# Patient Record
Sex: MALE | Race: WHITE | NOT HISPANIC OR LATINO | URBAN - METROPOLITAN AREA
[De-identification: names, ages, dates, MRNs, and addresses within clinical notes are randomized per-mention and may not be internally consistent; named-entity substitution may affect disease eponyms.]

---

## 2020-06-17 ENCOUNTER — OFFICE VISIT (OUTPATIENT)
Dept: URBAN - METROPOLITAN AREA CLINIC 63 | Facility: CLINIC | Age: 45
End: 2020-06-17

## 2020-07-14 ENCOUNTER — OFFICE VISIT (OUTPATIENT)
Dept: URBAN - METROPOLITAN AREA TELEHEALTH 2 | Facility: TELEHEALTH | Age: 45
End: 2020-07-14

## 2021-02-01 ENCOUNTER — OFFICE VISIT (OUTPATIENT)
Dept: URBAN - METROPOLITAN AREA CLINIC 121 | Facility: CLINIC | Age: 46
End: 2021-02-01

## 2021-06-16 ENCOUNTER — OFFICE VISIT (OUTPATIENT)
Dept: URBAN - METROPOLITAN AREA CLINIC 63 | Facility: CLINIC | Age: 46
End: 2021-06-16

## 2021-07-20 ENCOUNTER — OFFICE VISIT (OUTPATIENT)
Dept: URBAN - METROPOLITAN AREA SURGERY CENTER 4 | Facility: SURGERY CENTER | Age: 46
End: 2021-07-20

## 2021-08-04 ENCOUNTER — OFFICE VISIT (OUTPATIENT)
Dept: URBAN - METROPOLITAN AREA SURGERY CENTER 4 | Facility: SURGERY CENTER | Age: 46
End: 2021-08-04

## 2021-08-05 LAB — PATHOLOGY (INDENTED REPORT): (no result)

## 2022-07-09 ENCOUNTER — TELEPHONE ENCOUNTER (OUTPATIENT)
Dept: URBAN - METROPOLITAN AREA CLINIC 121 | Facility: CLINIC | Age: 47
End: 2022-07-09

## 2022-07-09 RX ORDER — RIFAXIMIN 550 MG/1
THREE TIMES A DAY TABLET ORAL THREE TIMES A DAY
Refills: 2 | OUTPATIENT
Start: 2019-01-28 | End: 2019-02-27

## 2022-07-09 RX ORDER — CYCLOBENZAPRINE HYDROCHLORIDE 10 MG/1
TABLET, FILM COATED ORAL TAKE AS DIRECTED
Refills: 0 | OUTPATIENT
Start: 2017-11-26 | End: 2017-12-05

## 2022-07-09 RX ORDER — DICYCLOMINE HYDROCHLORIDE 20 MG/1
FOUR TIMES A DAY TABLET ORAL
Refills: 3 | OUTPATIENT
Start: 2015-05-28 | End: 2015-05-28

## 2022-07-09 RX ORDER — HYDROCODONE BITARTRATE AND ACETAMINOPHEN 5; 325 MG/1; MG/1
TABLET ORAL TAKE AS DIRECTED
Refills: 0 | OUTPATIENT
Start: 2015-04-02 | End: 2015-05-28

## 2022-07-09 RX ORDER — AMLODIPINE BESYLATE AND BENAZEPRIL HYDROCHLORIDE 5; 10 MG/1; MG/1
CAPSULE ORAL ONCE A DAY
Refills: 0 | OUTPATIENT
Start: 2021-06-16 | End: 2021-06-16

## 2022-07-09 RX ORDER — OMEPRAZOLE 40 MG/1
ONCE A DAY CAPSULE, DELAYED RELEASE ORAL ONCE A DAY
Refills: 1 | OUTPATIENT
Start: 2020-07-14 | End: 2021-06-16

## 2022-07-09 RX ORDER — SUCRALFATE 1 G/1
FOUR TIMES A DAY DISSOLVE ONE TAB IN 4OZ WATER, PO, QAC AND QHS TABLET ORAL
Refills: 1 | OUTPATIENT
Start: 2017-12-27 | End: 2018-05-21

## 2022-07-09 RX ORDER — CHLORHEXIDINE GLUCONATE 1.2 MG/ML
RINSE ORAL TAKE AS DIRECTED
Refills: 0 | OUTPATIENT
Start: 2017-11-08 | End: 2017-12-05

## 2022-07-09 RX ORDER — PANCRELIPASE 36000; 180000; 114000 [USP'U]/1; [USP'U]/1; [USP'U]/1
TAKE AS DIRECTED: 2 CAPSULES WITH MEALS AND 1 CAPSULE WITH SNACKS CAPSULE, DELAYED RELEASE PELLETS ORAL TAKE AS DIRECTED
Refills: 3 | OUTPATIENT
Start: 2018-12-11 | End: 2019-01-28

## 2022-07-09 RX ORDER — DEXLANSOPRAZOLE 30 MG/1
ONCE A DAY CAPSULE, DELAYED RELEASE ORAL ONCE A DAY
Refills: 0 | OUTPATIENT
Start: 2018-01-30 | End: 2019-02-27

## 2022-07-09 RX ORDER — LEVOFLOXACIN 5 MG/ML
INJECTION INTRAVENOUS TAKE AS DIRECTED
Refills: 0 | OUTPATIENT
Start: 2015-04-02 | End: 2015-05-28

## 2022-07-09 RX ORDER — METRONIDAZOLE 500 MG/1
TABLET ORAL
Refills: 0 | OUTPATIENT
Start: 2013-04-18 | End: 2013-06-26

## 2022-07-09 RX ORDER — METRONIDAZOLE 250 MG/1
TABLET ORAL THREE TIMES A DAY
Refills: 0 | OUTPATIENT
Start: 2015-04-02 | End: 2015-05-28

## 2022-07-09 RX ORDER — RIFAXIMIN 550 MG/1
THREE TIMES A DAY, PO, X 2 WEEKS TABLET ORAL THREE TIMES A DAY
Refills: 2 | OUTPATIENT
Start: 2019-02-27 | End: 2019-02-27

## 2022-07-09 RX ORDER — OMEPRAZOLE 40 MG/1
ONCE A DAY TAKE 30 MIN PRIOR TO MORNING MEAL CAPSULE, DELAYED RELEASE ORAL ONCE A DAY
Refills: 3 | OUTPATIENT
Start: 2017-12-27 | End: 2019-02-27

## 2022-07-09 RX ORDER — SUCRALFATE 1 G/1
FOUR TIMES A DAY DISSOLVE ONE TAB IN 4OZ WATER, PO, QAC AND QHS TABLET ORAL
Refills: 1 | OUTPATIENT
Start: 2018-05-21 | End: 2019-02-27

## 2022-07-09 RX ORDER — CIPROFLOXACIN HCL 500 MG
TABLET ORAL
Refills: 0 | OUTPATIENT
Start: 2013-04-23 | End: 2013-06-26

## 2022-07-09 RX ORDER — OXYCODONE AND ACETAMINOPHEN 5; 325 MG/1; MG/1
TABLET ORAL TAKE AS DIRECTED
Refills: 0 | OUTPATIENT
Start: 2017-11-26 | End: 2017-12-05

## 2022-07-10 ENCOUNTER — TELEPHONE ENCOUNTER (OUTPATIENT)
Dept: URBAN - METROPOLITAN AREA CLINIC 121 | Facility: CLINIC | Age: 47
End: 2022-07-10

## 2022-07-10 RX ORDER — RIFAXIMIN 550 MG/1
TAKE ONE TABLET 3 TIMES A DAY FOR 14 DAYS TABLET ORAL THREE TIMES A DAY
Refills: 0 | Status: ACTIVE | COMMUNITY
Start: 2019-02-13

## 2022-07-10 RX ORDER — HYOSCYAMINE SULFATE 0.12 MG/1
AT BEDTIME TABLET, ORALLY DISINTEGRATING ORAL ONCE A DAY
Refills: 5 | Status: ACTIVE | COMMUNITY
Start: 2013-07-16

## 2022-07-10 RX ORDER — DICYCLOMINE HYDROCHLORIDE 20 MG/1
FOUR TIMES A DAY TABLET ORAL
Refills: 3 | Status: ACTIVE | COMMUNITY
Start: 2015-05-29

## 2022-07-10 RX ORDER — AMLODIPINE BESYLATE AND BENAZEPRIL HYDROCHLORIDE 5; 10 MG/1; MG/1
CAPSULE ORAL ONCE A DAY
Refills: 0 | Status: ACTIVE | COMMUNITY
Start: 2021-06-16

## 2022-07-10 RX ORDER — RIFAXIMIN 550 MG/1
THREE TIMES A DAY, PO, X 2 WEEKS TABLET ORAL THREE TIMES A DAY
Refills: 2 | Status: ACTIVE | COMMUNITY
Start: 2019-02-27

## 2022-07-10 RX ORDER — HYDROCORTISONE ACETATE 25 MG/1
SUPPOSITORY RECTAL TWICE A DAY
Refills: 5 | Status: ACTIVE | COMMUNITY
Start: 2013-07-16

## 2024-06-18 ENCOUNTER — OFFICE VISIT (OUTPATIENT)
Dept: URBAN - METROPOLITAN AREA CLINIC 63 | Facility: CLINIC | Age: 49
End: 2024-06-18
Payer: COMMERCIAL

## 2024-06-18 ENCOUNTER — LAB OUTSIDE AN ENCOUNTER (OUTPATIENT)
Dept: URBAN - METROPOLITAN AREA CLINIC 63 | Facility: CLINIC | Age: 49
End: 2024-06-18

## 2024-06-18 ENCOUNTER — DASHBOARD ENCOUNTERS (OUTPATIENT)
Age: 49
End: 2024-06-18

## 2024-06-18 VITALS
DIASTOLIC BLOOD PRESSURE: 80 MMHG | SYSTOLIC BLOOD PRESSURE: 122 MMHG | TEMPERATURE: 97.4 F | BODY MASS INDEX: 31.89 KG/M2 | HEIGHT: 71 IN | HEART RATE: 68 BPM | OXYGEN SATURATION: 98 % | WEIGHT: 227.8 LBS

## 2024-06-18 DIAGNOSIS — Z80.0 FAMILY HISTORY OF COLON CANCER: ICD-10-CM

## 2024-06-18 DIAGNOSIS — Z86.010 PERSONAL HISTORY OF COLONIC POLYPS: ICD-10-CM

## 2024-06-18 DIAGNOSIS — K57.92 DIVERTICULITIS: ICD-10-CM

## 2024-06-18 PROBLEM — 307496006: Status: ACTIVE | Noted: 2024-06-18

## 2024-06-18 PROCEDURE — 99204 OFFICE O/P NEW MOD 45 MIN: CPT | Performed by: PHYSICIAN ASSISTANT

## 2024-06-18 RX ORDER — AMLODIPINE BESYLATE 5 MG/1
TAKE ONE TABLET BY MOUTH ONE TIME DAILY TABLET ORAL
Qty: 90 UNSPECIFIED | Refills: 0 | Status: ACTIVE | COMMUNITY

## 2024-06-18 NOTE — HPI-TODAY'S VISIT:
48-year-old male, patient of Dr. Grant, with history of diverticulitis, hypertension, GERD, PUD,, colon polyps hiatal hernia repair in 2018 presents to the office for follow-up after having been seen in the ER on April 27, 2024 and again on June 17, 2024 with left lower quadrant abdominal pain.  At his first ER visit in April, he reported having left lower quadrant abdominal pain which began 1 day prior.  His labs including CBC, CMP and lipase were unremarkable.  CT scan of the abdomen and pelvis with contrast demonstrated peridiverticular inflammatory changes involving the proximal to mid sigmoid colon with CT appearance compatible with acute diverticulitis.  No evidence of perforation or abscess.  There was associated bowel wall thickening at the proximal to mid sigmoid colon may reflect developing inflammatory stricture.  He was discharged with a 7-day course of Cipro and Flagyl. He presented back to the ER yesterday with left lower quadrant abdominal pain which began 1 day prior.  His CBC, CMP, and lipase were unremarkable.  Urinalysis was negative.  CT scan of the abdomen and pelvis with contrast demonstrated acute descending colonic diverticulitis.  No abscess or perforation.  He was discharged with a 7-day course of Augmentin.  He follows up today doing well. He has no abdomnal pain today. He has no diarrhea or constipation. Bowels have been soft. No blood in the stool. He has not seen Dr Higuera since 2020. He never had his planned colectomy in 2020 for recurrent diverticulitis.   He was following with Dr. Higuera in 2020 for elective partial colon resection for recurrent diverticulitis.  He was initially scheduled in May 2020 but his surgery was canceled due to COVID-19.  Colonoscopy 8/4/2021:The terminal ileum appeared normal.  Pandiverticulosis.  Mild inflammation was found in the sigmoid colon.  Biopsy of the sigmoid colon showed colonic mucosa with prominent lymphoid follicles and no significant histopathological changes.  Grade 2 internal hemorrhoids were observed.  He has a family history of colon cancer in multiple uncles.

## 2024-07-22 ENCOUNTER — TELEPHONE ENCOUNTER (OUTPATIENT)
Dept: URBAN - METROPOLITAN AREA CLINIC 23 | Facility: CLINIC | Age: 49
End: 2024-07-22

## 2024-08-07 ENCOUNTER — CLAIMS CREATED FROM THE CLAIM WINDOW (OUTPATIENT)
Dept: URBAN - METROPOLITAN AREA SURGERY CENTER 4 | Facility: SURGERY CENTER | Age: 49
End: 2024-08-07
Payer: COMMERCIAL

## 2024-08-07 DIAGNOSIS — K57.30 DIVERTICULOSIS OF LARGE INTESTINE WITHOUT PERFORATION OR ABSCESS WITHOUT BLEEDING: ICD-10-CM

## 2024-08-07 DIAGNOSIS — R93.3 ABNORMAL FINDINGS ON DIAGNOSTIC IMAGING OF OTHER PARTS OF DIGESTIVE TRACT: ICD-10-CM

## 2024-08-07 DIAGNOSIS — K64.1 SECOND DEGREE HEMORRHOIDS: ICD-10-CM

## 2024-08-07 DIAGNOSIS — R93.5 ABNORMAL CT OF THE ABDOMEN: ICD-10-CM

## 2024-08-07 DIAGNOSIS — K64.0 GRADE I HEMORRHOIDS: ICD-10-CM

## 2024-08-07 DIAGNOSIS — K57.30 COLONIC DIVERTICULOSIS: ICD-10-CM

## 2024-08-07 PROCEDURE — 45378 DIAGNOSTIC COLONOSCOPY: CPT | Performed by: INTERNAL MEDICINE

## 2024-08-07 PROCEDURE — 00811 ANES LWR INTST NDSC NOS: CPT | Performed by: NURSE ANESTHETIST, CERTIFIED REGISTERED

## 2024-08-07 RX ORDER — AMLODIPINE BESYLATE 5 MG/1
TAKE ONE TABLET BY MOUTH ONE TIME DAILY TABLET ORAL
Qty: 90 UNSPECIFIED | Refills: 0 | Status: ACTIVE | COMMUNITY

## 2024-08-27 ENCOUNTER — OFFICE VISIT (OUTPATIENT)
Dept: URBAN - METROPOLITAN AREA CLINIC 63 | Facility: CLINIC | Age: 49
End: 2024-08-27
Payer: COMMERCIAL

## 2024-08-27 VITALS
TEMPERATURE: 98 F | SYSTOLIC BLOOD PRESSURE: 122 MMHG | HEART RATE: 72 BPM | OXYGEN SATURATION: 98 % | DIASTOLIC BLOOD PRESSURE: 84 MMHG | WEIGHT: 229.6 LBS | BODY MASS INDEX: 32.14 KG/M2 | HEIGHT: 71 IN

## 2024-08-27 DIAGNOSIS — K57.90 DIVERTICULOSIS: ICD-10-CM

## 2024-08-27 PROBLEM — 397881000: Status: ACTIVE | Noted: 2024-08-27

## 2024-08-27 PROCEDURE — 99213 OFFICE O/P EST LOW 20 MIN: CPT

## 2024-08-27 RX ORDER — AMLODIPINE BESYLATE 5 MG/1
TAKE ONE TABLET BY MOUTH ONE TIME DAILY TABLET ORAL
Qty: 90 UNSPECIFIED | Refills: 0 | Status: ACTIVE | COMMUNITY

## 2024-08-27 RX ORDER — PROMETHAZINE HYDROCHLORIDE 25 MG/ML
AS DIRECTED INJECTION INTRAMUSCULAR; INTRAVENOUS
Status: ON HOLD | COMMUNITY

## 2024-08-27 RX ORDER — OXYCODONE HYDROCHLORIDE AND ACETAMINOPHEN 5; 325 MG/1; MG/1
1 TABLET AS NEEDED TABLET ORAL
Status: ON HOLD | COMMUNITY

## 2024-08-27 RX ORDER — ONDANSETRON 4 MG/1
1 TABLET ON THE TONGUE AND ALLOW TO DISSOLVE TABLET, ORALLY DISINTEGRATING ORAL ONCE A DAY
Status: ON HOLD | COMMUNITY

## 2024-08-27 NOTE — HPI-PREVIOUS IMAGING
CT abdomen and pelvis with contrast, 6/17/2024 - Lung base: Bandlike opacity at the lung bases representing atelectasis. - Liver: No significant abnormality - Gallbladder biliary tree: No significant abnormality - Pancreas: No significant abnormality - Adrenal glands: No significant abnormality. - Lymph nodes: No lymphadenopathy by CT size criteria. - Bowel: Short segment mural thickening in mid descending colon with moderate pericolonic fat stranding and edema. No bowel obstruction. No evidence of appendicitis. - Pelvis: The bladder is mildly distended. No radiopaque stones in the bladder. The bladder is mildly distended. No radiopaque stones in the bladder. - Bones: No acute fracture with in the visualized osseous structures - Soft tissues: No soft tissue masses or fluid collections. - Peritoneum: No intraperitoneal free air or fluid - Kidneys/ureters: No significant abnormality. - Aorta/retroperitoneum: Atherosclerosis within the abdominal aorta and major branch vessels. No aneurysm.

## 2024-08-27 NOTE — HPI-PREVIOUS PROCEDURES
Colonoscopy, 8/7/2024, Dr. Grant - Diverticulosis in the entire examined colon. - Internal hemorrhoids. - The examination was otherwise normal. - No specimens collected. - Repeat colonoscopy in 5 years due to history of polyps, per Dr. Grant. . Colonoscopy 8/4/2021:The terminal ileum appeared normal. Pandiverticulosis. Mild inflammation was found in the sigmoid colon. Biopsy of the sigmoid colon showed colonic mucosa with prominent lymphoid follicles and no significant histopathological changes. Grade 2 internal hemorrhoids were observed.

## 2024-08-27 NOTE — PHYSICAL EXAM SKIN:
no rashes , no jaundice present , good turgor , no masses , no tenderness on palpation patient baseline/oriented to person, place and time

## 2024-08-27 NOTE — HPI-HOSPITAL FOLLOWUP
ED note from [facility] dated 6/17/2024 is as follows:  "Chief complaint: Flank pain HPI: Patient is a 66-year-old male who presents to the emergency department via walk-in with complaints of left side and lower abdominal pain that is associated with nausea, but no vomiting and started yesterday and woke him from sleep.  He reports his stool was 2 different colors, brown and beige/pale as well.  He reports the pain is with certain movements and if he lays still it is better.  Before arrival it felt like "something hit me in the stomach".  He also reports poor appetite and only drink water today.  He denies headache, tinnitus, vision changes, chest pain, shortness of breath, cough, flank pain, blood in the stool, upper and lower extremity numbness, weakness, tingling, edema and pain, vertigo and dizziness, all lower urinary tract symptoms including frequency, dysuria, hematuria, nocturia, incontinence, straining and feelings of complete emptying.  CT abdomen and pelvis with contrast, 6/17/2024 - Lung base: Bandlike opacity at the lung bases representing atelectasis. - Liver: No significant abnormality - Gallbladder biliary tree: No significant abnormality - Pancreas: No significant abnormality - Adrenal glands: No significant abnormality. - Lymph nodes: No lymphadenopathy by CT size criteria. - Bowel: Short segment mural thickening in mid descending colon with moderate pericolonic fat stranding and edema.  No bowel obstruction.  No evidence of appendicitis. - Pelvis: The bladder is mildly distended.  No radiopaque stones in the bladder.  The bladder is mildly distended.  No radiopaque stones in the bladder. - Bones: No acute fracture with in the visualized osseous structures - Soft tissues: No soft tissue masses or fluid collections. - Peritoneum: No intraperitoneal free air or fluid - Kidneys/ureters: No significant abnormality. - Aorta/retroperitoneum: Atherosclerosis within the abdominal aorta and major branch vessels.  No aneurysm.  Patient was given IV Zosyn, 4.5 g per 100 mL Patient was discharged with Augmentin, 875/125 mg every 12 hours, X 7 days  ED course: 48-year-old male evaluated in the ED and noted to have the encounter diagnosis had diverticulitis of large intestine without perforation or abscess without bleeding.  Imaging studies reviewed and CT abdomen pelvis demonstrate acute diverticulitis of the ascending colon without perforation or abscess.  Laboratory values demonstrated no acute abnormalities as reviewed above.  Patient's symptoms improved after Toradol, Phenergan, IV bolus and he received Zosyn in the ED.  Patient was discharged with prescription for Augmentin to treat diverticulitis and prescription for Percocet for severe pain.  Voltaren for mild to moderate pain, Zofran for nausea, Phenergan for refractory nausea vomiting and pain and referral to gastroenterologist.  He was advised to follow a clear liquid diet and if symptoms not improving over the next 24-48 hours for if any worsening, or any fever or chills, he should return to the emergency room for reevaluation as some cases of diverticulitis are resistant to oral antibiotics and he may require admission.  He will follow-up as reviewed below or return to the ED if symptoms worsen patient.  Patient remained stable during his ED evaluation and was discharged stable condition."

## 2024-08-27 NOTE — HPI-TODAY'S VISIT:
This is a very pleasant 48-year-old male who presents to the office with a chief complaint of procedure follow-up.  Past medical history is significant for diverticulitis, hypertension, GERD, PUD, colon polyps. Past surgical history is significant for herniorrhaphy.  Recent hospitalization on April 27, 2024 and again on June 17, 2024 with LLQ pain.  Family history is significant for CRC in multiple uncles. Last colonoscopy 8/7/2024, Dr. Grant, 5-year recall Last endoscopy, 12/13/2017, Dr. Grant Cardiologist: none. . Patient was last seen in the office on 6/18/2024 with Jaki mccoy for hospital follow-up of diverticulitis.  Patient was hospitalized on 4/27/2024 and again on 6/17/2024 for LLQ pain.  See details below.  At last office visit with Jaki, patient was doing well and denied abdominal pain.  At last visit, patient explained that Dr. Higuera of the colorectal's [place] in [place] had recommended an elective partial colon resection for recurrent diverticulitis.  Patient was scheduled for May 2020, but surgery was scheduled due to COVID-19. . Patient presents today to the office explaining he is doing well and is without LLQ pain and is asymptomatic from a GI perspective.  Reviewed patient's colonoscopy report. Patient had an appointment with colorectal surgery a few days ago, however patient explains he did not have access to the OP report to provide to Dr. Higuera.  Printed a physical copy for patient today.  Patient will follow-up as needed. . Patient denies abdominal pain, belching, bloating, constipation, diarrhea, dysphagia, pyrosis, melena, hematochezia, hematemesis, nausea, vomiting, BRBPR, and unintentional weight loss.

## 2025-03-11 ENCOUNTER — OFFICE VISIT (OUTPATIENT)
Dept: URBAN - METROPOLITAN AREA CLINIC 63 | Facility: CLINIC | Age: 50
End: 2025-03-11

## 2025-03-11 RX ORDER — AMLODIPINE BESYLATE 5 MG/1
TAKE ONE TABLET BY MOUTH ONE TIME DAILY TABLET ORAL
Qty: 90 UNSPECIFIED | Refills: 0 | Status: ACTIVE | COMMUNITY

## 2025-03-11 RX ORDER — OXYCODONE HYDROCHLORIDE AND ACETAMINOPHEN 5; 325 MG/1; MG/1
1 TABLET AS NEEDED TABLET ORAL
Status: ACTIVE | COMMUNITY

## 2025-03-11 RX ORDER — PROMETHAZINE HYDROCHLORIDE 25 MG/ML
AS DIRECTED INJECTION INTRAMUSCULAR; INTRAVENOUS
Status: ACTIVE | COMMUNITY